# Patient Record
Sex: FEMALE | Race: WHITE
[De-identification: names, ages, dates, MRNs, and addresses within clinical notes are randomized per-mention and may not be internally consistent; named-entity substitution may affect disease eponyms.]

---

## 2019-10-17 ENCOUNTER — HOSPITAL ENCOUNTER (OUTPATIENT)
Dept: HOSPITAL 92 - TBSIIMAG | Age: 74
Discharge: HOME | End: 2019-10-17
Attending: NEUROLOGICAL SURGERY
Payer: MEDICARE

## 2019-10-17 DIAGNOSIS — M54.5: Primary | ICD-10-CM

## 2019-10-17 DIAGNOSIS — Z98.890: ICD-10-CM

## 2019-10-17 DIAGNOSIS — N20.0: ICD-10-CM

## 2019-10-17 DIAGNOSIS — M47.816: ICD-10-CM

## 2019-10-17 PROCEDURE — 72131 CT LUMBAR SPINE W/O DYE: CPT

## 2019-10-17 NOTE — CT
LUMBAR SPINE CT WITHOUT CONTRAST:

 

Date:  10/17/19 

 

HISTORY:  

Pain with bilateral lower extremity radiculopathy. 

 

TECHNIQUE:  

Axial CT imaging at 3 mm intervals through the lumbar spine without contrast. Coronal and sagittal re
formatted imaging obtained. 

 

FINDINGS:

Evaluation for central canal and/or neural foraminal stenosis is limited on routine CT. There is an a
nterior wedge compression fracture with severe loss of vertebral body height anteriorly at T12, prima
rily involving the inferior end plate. There is a probable associated Schmorl's node along the inferi
or end plate of T12 as well. 

 

There are intrarenal calculi present bilaterally, including a punctate calcification in the mid pole 
of the right kidney, a 2 mm calcification in the mid pole of the left kidney, and a 7 mm calcificatio
n within the lower pole of the left kidney. 

 

T12-L1:  Disc space narrowing with degenerative end plate change. Bilateral facet hypertrophy. Signif
icant bilateral neural foraminal stenosis. At least mild central canal stenosis. 

 

L1-2:  Bilateral facet hypertrophy with mild/moderate bilateral neural foraminal stenosis. No osseous
 cause of significant central canal stenosis.

 

L2-3:  Mild bilateral facet hypertrophy and hypertrophy of the ligamentum flavum with at least mild b
ilateral neural foraminal stenosis. No osseous cause of significant central canal stenosis. 

 

L3-4:  Prominent bilateral facet hypertrophy and hypertrophy of the ligamentum flavum. At least mild 
disc bulge present. Probable mild/moderate right and moderate left neural foraminal stenosis, as well
 as moderate central canal stenosis. 

 

L4-5:  Prominent bilateral facet hypertrophy. There is disc space narrowing with vacuum disc formatio
n. There is anterolisthesis of L4 on L5 measuring 8.0 mm. There is moderate/severe bilateral neural f
oraminal stenosis and probable mild central canal stenosis. Patient is status post bilateral laminect
yasmany at this level. 

 

L5-S1:  Prominent bilateral facet hypertrophy. There is disc space narrowing and vacuum disc formatio
n. Mild central canal stenosis and moderate/severe bilateral neural foraminal stenosis suspected. No 
worrisome lytic or blastic bone lesion. 

 

IMPRESSION: 

1.  Postoperative and degenerative change noted within the lumbar spine as detailed above. There is a
n old T12 fracture with severe loss of vertebral body height anteriorly. If further evaluation for ce
ntral canal and/or neural foraminal stenosis is clinically warranted, MRI or CT myelogram advised. 

 

2.  Bilateral nonobstructing renal calculi. 

 

 

POS: [facility]